# Patient Record
Sex: FEMALE | URBAN - NONMETROPOLITAN AREA
[De-identification: names, ages, dates, MRNs, and addresses within clinical notes are randomized per-mention and may not be internally consistent; named-entity substitution may affect disease eponyms.]

---

## 2022-11-16 ENCOUNTER — APPOINTMENT (RX ONLY)
Dept: URBAN - NONMETROPOLITAN AREA CLINIC 1 | Facility: CLINIC | Age: 72
Setting detail: DERMATOLOGY
End: 2022-11-16

## 2022-11-16 DIAGNOSIS — L20.89 OTHER ATOPIC DERMATITIS: ICD-10-CM | Status: INADEQUATELY CONTROLLED

## 2022-11-16 DIAGNOSIS — L30.9 DERMATITIS, UNSPECIFIED: ICD-10-CM | Status: INADEQUATELY CONTROLLED

## 2022-11-16 PROCEDURE — ? COUNSELING

## 2022-11-16 PROCEDURE — ? PRESCRIPTION MEDICATION MANAGEMENT

## 2022-11-16 PROCEDURE — 99204 OFFICE O/P NEW MOD 45 MIN: CPT

## 2022-11-16 PROCEDURE — ? PRESCRIPTION

## 2022-11-16 PROCEDURE — ? MEDICATION COUNSELING

## 2022-11-16 RX ORDER — TRIAMCINOLONE ACETONIDE 1 MG/G
OINTMENT TOPICAL
Qty: 454 | Refills: 3 | Status: ERX | COMMUNITY
Start: 2022-11-16

## 2022-11-16 RX ORDER — HYDROCORTISONE 25 MG/G
OINTMENT TOPICAL
Qty: 453.6 | Refills: 3 | Status: ERX | COMMUNITY
Start: 2022-11-16

## 2022-11-16 RX ORDER — KETOCONAZOLE 20 MG/ML
SHAMPOO, SUSPENSION TOPICAL
Qty: 120 | Refills: 3 | Status: ERX | COMMUNITY
Start: 2022-11-16

## 2022-11-16 RX ORDER — FLUOCINOLONE ACETONIDE 0.1 MG/ML
SOLUTION TOPICAL
Qty: 60 | Refills: 3 | Status: ERX | COMMUNITY
Start: 2022-11-16

## 2022-11-16 RX ADMIN — TRIAMCINOLONE ACETONIDE: 1 OINTMENT TOPICAL at 00:00

## 2022-11-16 RX ADMIN — KETOCONAZOLE: 20 SHAMPOO, SUSPENSION TOPICAL at 00:00

## 2022-11-16 RX ADMIN — FLUOCINOLONE ACETONIDE: 0.1 SOLUTION TOPICAL at 00:00

## 2022-11-16 RX ADMIN — HYDROCORTISONE: 25 OINTMENT TOPICAL at 00:00

## 2022-11-16 ASSESSMENT — LOCATION DETAILED DESCRIPTION DERM
LOCATION DETAILED: LEFT PROXIMAL DORSAL FOREARM
LOCATION DETAILED: LEFT SUPERIOR PARIETAL SCALP
LOCATION DETAILED: RIGHT PROXIMAL RADIAL DORSAL FOREARM

## 2022-11-16 ASSESSMENT — LOCATION SIMPLE DESCRIPTION DERM
LOCATION SIMPLE: LEFT FOREARM
LOCATION SIMPLE: RIGHT FOREARM
LOCATION SIMPLE: SCALP

## 2022-11-16 ASSESSMENT — LOCATION ZONE DERM
LOCATION ZONE: SCALP
LOCATION ZONE: ARM

## 2022-11-16 NOTE — PROCEDURE: MEDICATION COUNSELING
Received paperwork. Signed and faxed back.    Calcipotriene Pregnancy And Lactation Text: This medication has not been proven safe during pregnancy. It is unknown if this medication is excreted in breast milk.

## 2022-11-16 NOTE — PROCEDURE: MEDICATION COUNSELING
S/P lumpectomy, left breast  2004 Cyclophosphamide Counseling:  I discussed with the patient the risks of cyclophosphamide including but not limited to hair loss, hormonal abnormalities, decreased fertility, abdominal pain, diarrhea, nausea and vomiting, bone marrow suppression and infection. The patient understands that monitoring is required while taking this medication.

## 2022-11-16 NOTE — PROCEDURE: PRESCRIPTION MEDICATION MANAGEMENT
Initiate Treatment: Ketoconazole 2% shampoo up to twice a week\\nFluocinolone 0.1% topical solution bid x 2 weeks, then bid weekends only PRN for maintenance\\nHydrocortisone 2.5% ointment bid x 2 weeks, then bid weekends only PRN for maintenance
Render In Strict Bullet Format?: No
Detail Level: Zone
Initiate Treatment: Triamcinolone 0.1% ointment bid x 2 weeks, then bid weekends only PRN for maintenance

## 2022-12-14 ENCOUNTER — APPOINTMENT (RX ONLY)
Dept: URBAN - NONMETROPOLITAN AREA CLINIC 1 | Facility: CLINIC | Age: 72
Setting detail: DERMATOLOGY
End: 2022-12-14

## 2022-12-14 DIAGNOSIS — L21.8 OTHER SEBORRHEIC DERMATITIS: ICD-10-CM | Status: STABLE

## 2022-12-14 DIAGNOSIS — L20.89 OTHER ATOPIC DERMATITIS: ICD-10-CM | Status: STABLE

## 2022-12-14 DIAGNOSIS — Z41.9 ENCOUNTER FOR PROCEDURE FOR PURPOSES OTHER THAN REMEDYING HEALTH STATE, UNSPECIFIED: ICD-10-CM

## 2022-12-14 PROCEDURE — ? MEDICATION COUNSELING

## 2022-12-14 PROCEDURE — 99214 OFFICE O/P EST MOD 30 MIN: CPT

## 2022-12-14 PROCEDURE — ? PRESCRIPTION MEDICATION MANAGEMENT

## 2022-12-14 PROCEDURE — ? PRODUCT LINE (OBAGI)

## 2022-12-14 PROCEDURE — ? OTHER (COSMETIC)

## 2022-12-14 PROCEDURE — ? COUNSELING

## 2022-12-14 PROCEDURE — ? COSMETIC CONSULTATION: HYDRAFACIAL

## 2022-12-14 ASSESSMENT — LOCATION DETAILED DESCRIPTION DERM
LOCATION DETAILED: RIGHT ELBOW
LOCATION DETAILED: RIGHT MEDIAL FRONTAL SCALP
LOCATION DETAILED: LEFT ELBOW
LOCATION DETAILED: RIGHT INFERIOR CENTRAL MALAR CHEEK
LOCATION DETAILED: RIGHT MEDIAL INFERIOR EYELID
LOCATION DETAILED: LEFT MEDIAL FRONTAL SCALP

## 2022-12-14 ASSESSMENT — LOCATION SIMPLE DESCRIPTION DERM
LOCATION SIMPLE: LEFT SCALP
LOCATION SIMPLE: RIGHT ELBOW
LOCATION SIMPLE: LEFT ELBOW
LOCATION SIMPLE: RIGHT INFERIOR EYELID
LOCATION SIMPLE: RIGHT CHEEK
LOCATION SIMPLE: RIGHT SCALP

## 2022-12-14 ASSESSMENT — LOCATION ZONE DERM
LOCATION ZONE: EYELID
LOCATION ZONE: SCALP
LOCATION ZONE: FACE
LOCATION ZONE: ARM

## 2022-12-14 NOTE — PROCEDURE: MEDICATION COUNSELING
reviewed Tranexamic Acid Counseling:  Patient advised of the small risk of bleeding problems with tranexamic acid. They were also instructed to call if they developed any nausea, vomiting or diarrhea. All of the patient's questions and concerns were addressed.

## 2022-12-14 NOTE — PROCEDURE: MEDICATION COUNSELING
Low Dose Naltrexone Pregnancy And Lactation Text: Naltrexone is pregnancy category C.  There have been no adequate and well-controlled studies in pregnant women.  It should be used in pregnancy only if the potential benefit justifies the potential risk to the fetus.   Limited data indicates that naltrexone is minimally excreted into breastmilk. Mirvaso Counseling: Mirvaso is a topical medication which can decrease superficial blood flow where applied. Side effects are uncommon and include stinging, redness and allergic reactions.

## 2022-12-14 NOTE — PROCEDURE: PRODUCT LINE (OBAGI)
Product 25 Units: 0
Product 49 Price (In Dollars - Numeric Only, No Special Characters Or $): 0.00
Product 4 Price (In Dollars - Numeric Only, No Special Characters Or $): 98
Product 20 Application Directions: apply small amount to damp face and gentle rub in. leave on 1-3 minutes first application and work time up each time till can tolerate 10-15 minutes. 2-3 times a week or prn.
Product 29 Application Directions: apply to face as directed
Detail Level: Detailed
Name Of Product 10: Obagi Nu Cil eyelash enhancer
Name Of Product 13: Obagi make up wipes
Product 23 Price (In Dollars - Numeric Only, No Special Characters Or $): 43
Product 25 Application Directions: apply to face qam
Product 7 Price (In Dollars - Numeric Only, No Special Characters Or $): 74
Product 15 Application Directions: wash face am and pm
Name Of Product 4: Daily Hydro Drops
Product 32 Application Directions: Gentle Cleanser AM/PM, Toner AM/PM, Clear Rx AM/PM, Exfoderm AM,  Rx PM (mix with Tretinoin 0.025%), Hydrate AM/PM, Sun Shield MATTE Spf AM
Name Of Product 21: Obagi Nu Derm gentle cleanser
Product 1 Price (In Dollars - Numeric Only, No Special Characters Or $): 198
Name Of Product 28: Obagi Sun Shield Tint COOL
Product 13 Price (In Dollars - Numeric Only, No Special Characters Or $): 24
Product 9 Application Directions: apply 4-5 drops to face qam
Name Of Product 7: Obagi Hydrate luxe
Name Of Product 41: Happy Hydration Kit
Name Of Product 16: Obagi Clenziderm Pore Therapy
Product 23 Application Directions: apply to cotton pad and wipe face after cleansing am and pm
Product 3 Application Directions: Apply to neck qam and qhs.
Name Of Product 33: Obagi Nu- Derm Oily System
Name Of Product 1: Elastiderm Facial Serum
Name Of Product 26: Exfoderm Forte
Product 28 Price (In Dollars - Numeric Only, No Special Characters Or $): 53
Product 41 Price (In Dollars - Numeric Only, No Special Characters Or $): 145
Product 6 Application Directions: Apply qam under eyes
Product 18 Application Directions: apply to face prn dryness
Name Of Product 6: Obagi Elastiderm Eye Serum
Name Of Product 24: Obagi Nu Derm Clear
Product 33 Price (In Dollars - Numeric Only, No Special Characters Or $): 485
Product 9 Price (In Dollars - Numeric Only, No Special Characters Or $): 92
Product 6 Units: 1
Product 3 Price (In Dollars - Numeric Only, No Special Characters Or $): 250
Product 13 Application Directions: Wipe face with one wipe prn
Render Product Pricing In Note: Yes
Product 24 Price (In Dollars - Numeric Only, No Special Characters Or $): 129
Product 11 Price (In Dollars - Numeric Only, No Special Characters Or $): 65
Product 41 Application Directions: Obagi Daily Hydro Drops and Hydrate Luxe. apply prn
Name Of Product 9: Obagi vitamin c 10% serum
Product 6 Price (In Dollars - Numeric Only, No Special Characters Or $): 115.50
Product 33 Application Directions: Foaming Cleanser AM/PM, Toner AM/PM, Clear Rx AM/PM, Exfoderm Forte AM,  Rx PM (mix with Tretinoin 0.025%), Hydrate AM/PM, Sun Shield MATTE Spf AM
Name Of Product 14: Obagi acne wipes
Name Of Product 31: OBAGI RX C OILY KIT
Product 5 Application Directions: Apply a thin layer qhs around eyes
Product 16 Application Directions: pump onto cotton pad and wipe face after cleansing
Name Of Product 3: Obagi Elastiderm neck and décolleté concentrate
Send Charges To Patient Encounter: No
Product 8 Application Directions: Apply bid to neck and chest. discussed realistic expectations.
Name Of Product 34: RX C cleansing gel
Product 11 Application Directions: Apply small amount to AA at night to clean dry face. Start off two nights a week, if tolerated will add 1 night each week till tolerated every night.
Name Of Product 22: Obagi Nu Derm Foaming Cleanser
Name Of Product 17: Obagi Clenziderm Therapeutic Lotion 5% BPO
Product 31 Price (In Dollars - Numeric Only, No Special Characters Or $): 373
Product 2 Application Directions: apply to face am. prn dryness
Product 28 Application Directions: apply to face qam. PRN every 2 hours when in the sun.
Name Of Product 12: Obagi Tretinoin 0.025% cream
Name Of Product 27: Obagi Nu Derm 
Product 24 Application Directions: apply to aa dark spots face am and pm. If irritation occurs can use just pm. adjust as tolerated. Advised to use 3 months then take a break.
Product 17 Price (In Dollars - Numeric Only, No Special Characters Or $): 88
Name Of Product 11: Obagi Tretinoin 0.05% cream
Allow Plan To Count Towards E/M Coding: No (this will remove associated impression from E/M calculation)
Name Of Product 5: Obagi Elastiderm Eye Cream
Product 31 Application Directions: Cleanser AM/PM, Toner AM/PM, clarifying serum AM, Night cream PM, Sun Shield Matte SPF
Name Of Product 20: Professional C Microdermabrasion Polish + Mask
Product 2 Price (In Dollars - Numeric Only, No Special Characters Or $): 54
Name Of Product 29: 360 Hydrafactor moisturizer with SPF
Name Of Product 25: Obagi Nu Derm Exfoderm
Product 27 Price (In Dollars - Numeric Only, No Special Characters Or $): 125
Product 10 Application Directions: Apply to thin layer to base of upper lash line qhs
Name Of Product 8: Obagi Elastiderm Neck and Decollete Concentrate
Risk Of Complication Category: No MDM
Name Of Product 15: Obagi Clenziderm Daily Care Foaming Cleanser
Product 4 Application Directions: Apply prn for moisturizer
Name Of Product 32: Obagi Nu- Derm Dry System
Assigning Risk Information: Per AMA, level of risk is based upon consequences of the problem(s) addressed at the encounter when appropriately treated. Risk also includes medical decision making related to the need to initiate or forego further testing, treatment and/or hospitalization. Over the counter medication are assigned a risk level of low. Prescription medication management is assigned a risk level of moderate.
Name Of Product 2: Obagi Hydrate
Product 20 Price (In Dollars - Numeric Only, No Special Characters Or $): 84
Product 29 Price (In Dollars - Numeric Only, No Special Characters Or $): 58
Product 7 Application Directions: Apply to face pm. prn for dry skin
Name Of Product 23: Obagi Toner
Product 27 Application Directions: apply thin layer to dark spots on face am and pm.
Product 10 Price (In Dollars - Numeric Only, No Special Characters Or $): 120
Product 12 Application Directions: mix a small amount with Obagi  at night
Product 1 Application Directions: apply to face bid
Name Of Product 18: Obagi Clenziderm Therapeutic Moisturizer

## 2022-12-14 NOTE — PROCEDURE: PRESCRIPTION MEDICATION MANAGEMENT
Render In Strict Bullet Format?: No
Detail Level: Zone
Continue Regimen: Ketoconazole 2% shampoo once a week \\nFluocinolone 0.1% topical solution bid x another 2 weeks, then bid biw PRN for maintenance.\\nHydrocortisone 2.5% ointment bid biw PRN for maintenance
Continue Regimen: Triamcinolone 0.1% ointment bid biw PRN for maintenance

## 2022-12-14 NOTE — PROCEDURE: MEDICATION COUNSELING
no VTAMA Counseling: I discussed with the patient that VTAMA is not for use in the eyes, mouth or mouth. They should call the office if they develop any signs of allergic reactions to VTAMA. The patient verbalized understanding of the proper use and possible adverse effects of VTAMA.  All of the patient's questions and concerns were addressed.

## 2022-12-14 NOTE — PROCEDURE: OTHER (COSMETIC)
Detail Level: Zone
Other (Free Text): Product special this month [employer] 50% off products.\\n\\nShe will come back to get the ISDIN Drops Sand and RE Awakening eye complex. \\n\\nSamples of Obagi Elastiderm Facial Serum and Obagi SPF given today.

## 2023-06-23 NOTE — PROCEDURE: MEDICATION COUNSELING
- C/w atorvastatin 5mg PO Mirvaso Pregnancy And Lactation Text: This medication has not been assigned a Pregnancy Risk Category. It is unknown if the medication is excreted in breast milk.
